# Patient Record
Sex: MALE | Race: WHITE | HISPANIC OR LATINO
[De-identification: names, ages, dates, MRNs, and addresses within clinical notes are randomized per-mention and may not be internally consistent; named-entity substitution may affect disease eponyms.]

---

## 2017-01-10 ENCOUNTER — LAB SERVICES (OUTPATIENT)
Dept: OTHER | Age: 22
End: 2017-01-10

## 2017-01-12 ENCOUNTER — CHARTING TRANS (OUTPATIENT)
Dept: OTHER | Age: 22
End: 2017-01-12

## 2017-01-12 LAB
ALLERGEN CLAM IGE (CLMIGE): <0.35 KU/L
ALLERGEN CODFISH IGE (CODF): <0.35 KU/L
ALLERGEN CORN IGE (CRN): <0.35 KU/L
ALLERGEN COW'S MILK IGE (MLK): <0.35 KU/L
ALLERGEN EGG WHITE IGE (EGGW): <0.35 KU/L
ALLERGEN PEANUT IGE (PNUT): <0.35 KU/L
ALLERGEN SCALLOP IGE (SCALLP): <0.35 KU/L
ALLERGEN SHRIMP IGE (SRMP): <0.35 KU/L
ALLERGEN SOYBEAN IGE (SOY): <0.35 KU/L
ALLERGEN WALNUT IGE (WALIGE): <0.35 KU/L
ALLERGEN WHEAT IGE (WEAT): <0.35 KU/L
CLAM CLASS (CLMCL): 0
CODFISH CLASS (CODCL): 0
CORN CLASS (CRNCL): 0
COW'S MILK CLASS (MLKCL): 0
EGG WHITE CLASS (EGGCL): 0
PEANUT CLASS (PNUCL): 0
SCALLOP CLASS (SCACL): 0
SHRIMP CLASS (SRMCL): 0
SOYBEAN CLASS (SOYCL): 0
WALNUT CLASS (WALCL): 0
WHEAT CLASS (WEACL): 0

## 2018-04-11 ENCOUNTER — OFFICE VISIT (OUTPATIENT)
Dept: FAMILY MEDICINE CLINIC | Facility: CLINIC | Age: 23
End: 2018-04-11

## 2018-04-11 VITALS
HEART RATE: 112 BPM | HEIGHT: 67 IN | BODY MASS INDEX: 17.27 KG/M2 | SYSTOLIC BLOOD PRESSURE: 104 MMHG | DIASTOLIC BLOOD PRESSURE: 62 MMHG | WEIGHT: 110 LBS

## 2018-04-11 DIAGNOSIS — Z00.00 ROUTINE GENERAL MEDICAL EXAMINATION AT A HEALTH CARE FACILITY: Primary | ICD-10-CM

## 2018-04-11 PROCEDURE — 99385 PREV VISIT NEW AGE 18-39: CPT | Performed by: FAMILY MEDICINE

## 2018-04-18 NOTE — PROGRESS NOTES
Kiki Rodriguez is a 23year old male who is here for Patient presents with:   Well Adult      HPI:     Here to establish care    Patient has severe autism  -has been home schooled by his parents, who have made use of many services in the past  -they feel lik Alcohol use:  No                    EXAM:   /62 (BP Location: Left arm, Patient Position: Sitting, Cuff Size: adult)   Pulse 112   Ht 67\"   Wt 110 lb   BMI 17.23 kg/m²     GENERAL: thin, anxious appearing  NECK: supple, no jvd, no thyromeg

## 2019-06-12 ENCOUNTER — OFFICE VISIT (OUTPATIENT)
Dept: FAMILY MEDICINE CLINIC | Facility: CLINIC | Age: 24
End: 2019-06-12
Payer: COMMERCIAL

## 2019-06-12 VITALS
DIASTOLIC BLOOD PRESSURE: 60 MMHG | HEIGHT: 67 IN | BODY MASS INDEX: 18.56 KG/M2 | WEIGHT: 118.25 LBS | HEART RATE: 96 BPM | SYSTOLIC BLOOD PRESSURE: 110 MMHG | TEMPERATURE: 96 F

## 2019-06-12 DIAGNOSIS — F81.89 DEVELOPMENTAL NON-VERBAL DISORDER: ICD-10-CM

## 2019-06-12 DIAGNOSIS — Z00.00 ROUTINE GENERAL MEDICAL EXAMINATION AT A HEALTH CARE FACILITY: Primary | ICD-10-CM

## 2019-06-12 DIAGNOSIS — F41.9 ANXIETY: ICD-10-CM

## 2019-06-12 DIAGNOSIS — F84.0 AUTISM DISORDER: ICD-10-CM

## 2019-06-12 PROCEDURE — 99395 PREV VISIT EST AGE 18-39: CPT | Performed by: FAMILY MEDICINE

## 2019-06-12 PROCEDURE — 99214 OFFICE O/P EST MOD 30 MIN: CPT | Performed by: FAMILY MEDICINE

## 2019-06-12 NOTE — PATIENT INSTRUCTIONS
I will touch base with psychiatrist and get back to you with a plan     will touch base with you to potentially help with community resources     Followup as needed

## 2019-06-12 NOTE — PROGRESS NOTES
Atul Ha is a 26year old male who is here for Patient presents with:   Anxiety: Patient is having a lot of anxiety lately      HPI:     Due for wellness    Patient has severe autism/Anxiety  -non-verbal  -appears to have significant anxiety  -dad thin Tobacco Use      Smoking status: Never Smoker      Smokeless tobacco: Never Used    Alcohol use: No    Drug use: No          EXAM:   /60 (BP Location: Left arm, Patient Position: Sitting, Cuff Size: adult)   Pulse 96   Temp (!) 96.4 °F (35.8 °C) (Ora

## 2019-06-17 ENCOUNTER — TELEPHONE (OUTPATIENT)
Dept: FAMILY MEDICINE CLINIC | Facility: CLINIC | Age: 24
End: 2019-06-17

## 2019-06-17 RX ORDER — FLUOXETINE HYDROCHLORIDE 20 MG/5ML
10 LIQUID ORAL NIGHTLY
Qty: 120 ML | Refills: 1 | Status: SHIPPED | OUTPATIENT
Start: 2019-06-17 | End: 2019-07-10

## 2019-06-17 RX ORDER — FLUOXETINE HYDROCHLORIDE 20 MG/5ML
10 LIQUID ORAL NIGHTLY
Qty: 120 ML | Refills: 1 | Status: SHIPPED | OUTPATIENT
Start: 2019-06-17 | End: 2019-06-17

## 2019-06-17 NOTE — TELEPHONE ENCOUNTER
Called patient's father. We will treat anxiety and repetitive behaviors with liquid fluoxetine 10mg nightly. Side effects reviewed.   He will touch base with us in 2 wks, sooner if needed

## 2019-07-03 ENCOUNTER — TELEPHONE (OUTPATIENT)
Dept: FAMILY MEDICINE CLINIC | Facility: CLINIC | Age: 24
End: 2019-07-03

## 2019-07-03 NOTE — TELEPHONE ENCOUNTER
Pt father came in states pt is tolerating new medication Fluoxetine well with no side effects but not sure it's really doing much to help either.  Patients father would like to know if pt should continue same dose or will Dr. Romeo Garcia consider increasing the d

## 2019-07-03 NOTE — TELEPHONE ENCOUNTER
Phoned father and instructed him to continue with current dose and call on Monday when Dr. Ayana Hitchcock is in the office.

## 2019-07-10 ENCOUNTER — TELEPHONE (OUTPATIENT)
Dept: FAMILY MEDICINE CLINIC | Facility: CLINIC | Age: 24
End: 2019-07-10

## 2019-07-10 RX ORDER — FLUOXETINE HYDROCHLORIDE 20 MG/5ML
20 LIQUID ORAL NIGHTLY
Qty: 120 ML | Refills: 1 | Status: SHIPPED | OUTPATIENT
Start: 2019-07-10 | End: 2019-07-26

## 2019-07-10 NOTE — TELEPHONE ENCOUNTER
Ok to increase the dose to 20mg (or 5ml), if he hasn't already done so. If he has, increase dose to 40mg (10ml). It can take another 2-4 wks to take effect. Touch base with me at that time.     If needs refill, ok for refill with updated dosing and 3

## 2019-07-10 NOTE — TELEPHONE ENCOUNTER
Dr. Gilma Beard. Pts father called and stated his son is tolerating the new medication (fluoxetine) but doesn't seem to be helping and is wondering if they can increase the dose. OV 6/17/19. Please advise.

## 2019-07-10 NOTE — TELEPHONE ENCOUNTER
Father called back. He has been giving the 10mg dose (2.5ml). Instructed him to increase dose and allow 2-4 weeks to take effect and then touch base. Stated he will need a refill so refill sent.

## 2019-07-26 NOTE — TELEPHONE ENCOUNTER
Spoke with father and relayed new instructions. He verbalized understanding and agreed with plan. Medication list updated.

## 2019-07-26 NOTE — TELEPHONE ENCOUNTER
Atul Forte is calling to see if Dr Lalo Carbajal is going to increase [address] FLUoxetine HCl 20 MG/5ML Oral Solution  He is currently taking 5ml 1x at bed, he started at a low dose and Dr Lalo Carbajal doubled it 2 weeks ago, and now it has been about 16 days and he is due for a

## 2019-07-26 NOTE — TELEPHONE ENCOUNTER
Have them increase to 10ml (40mg) nightly. Touch base in 2-4 wks. If still no change, let me know - we may have to switch medication.

## 2019-07-29 DIAGNOSIS — F41.9 ANXIETY: ICD-10-CM

## 2019-07-29 RX ORDER — FLUOXETINE HYDROCHLORIDE 20 MG/5ML
40 LIQUID ORAL NIGHTLY
Qty: 360 ML | Refills: 0 | Status: SHIPPED | OUTPATIENT
Start: 2019-07-29 | End: 2019-08-01

## 2019-08-01 ENCOUNTER — TELEPHONE (OUTPATIENT)
Dept: FAMILY MEDICINE CLINIC | Facility: CLINIC | Age: 24
End: 2019-08-01

## 2019-08-01 DIAGNOSIS — F41.9 ANXIETY: ICD-10-CM

## 2019-08-01 RX ORDER — FLUOXETINE HYDROCHLORIDE 20 MG/5ML
40 LIQUID ORAL NIGHTLY
Qty: 900 ML | Refills: 0 | Status: SHIPPED | OUTPATIENT
Start: 2019-08-01 | End: 2019-10-19

## 2019-08-07 ENCOUNTER — TELEPHONE (OUTPATIENT)
Dept: FAMILY MEDICINE CLINIC | Facility: CLINIC | Age: 24
End: 2019-08-07

## 2019-08-07 NOTE — TELEPHONE ENCOUNTER
Pt's father Carla Floyd) dropped off court documents that need to be filled out by Dr. Sydney Fuentes. Joseluis said Susan Hunter has an appt next week and will get the papers back from Dr. Sydney Fuentes on Tuesday. I put the papers on Karyn's desk.

## 2019-08-13 NOTE — TELEPHONE ENCOUNTER
Phoned his father. He stated there is not much difference with the new dose. Its been about one week since new dose. Father is OK with waiting until office visit to make any changes. He stated he will call if anything else comes up.

## 2019-08-13 NOTE — TELEPHONE ENCOUNTER
Please apologize for cancelled appt today. Also see how Stefani Sterling is doing with higher dose of fluoxetine. If they still haven't noticed any changes, we can consider trying a different medication. Let me know, Thanks.

## 2019-08-19 PROBLEM — F41.9 ANXIETY: Status: ACTIVE | Noted: 2019-08-19

## 2019-08-19 PROBLEM — F81.89 DEVELOPMENTAL NON-VERBAL DISORDER: Status: ACTIVE | Noted: 2019-08-19

## 2019-08-19 PROBLEM — F84.0 AUTISM DISORDER: Status: ACTIVE | Noted: 2019-08-19

## 2019-08-19 NOTE — TELEPHONE ENCOUNTER
Left voicemail, informing patient's father that [address] papers are filled out and ready to be picked up. Asked father to call office with any questions or concerns.

## 2019-10-19 DIAGNOSIS — F41.9 ANXIETY: ICD-10-CM

## 2019-10-21 RX ORDER — FLUOXETINE HYDROCHLORIDE 20 MG/5ML
LIQUID ORAL
Qty: 780 ML | Refills: 1 | Status: SHIPPED | OUTPATIENT
Start: 2019-10-21 | End: 2020-01-15

## 2019-10-21 NOTE — TELEPHONE ENCOUNTER
Pt requesting refill of FLUOXETINE HCL 20 MG/5ML Oral Solution , refill approved, sent to pharmacy:       Last Office Visit with PCP:  6/12/19      No future appointments.

## 2020-01-10 ENCOUNTER — TELEPHONE (OUTPATIENT)
Dept: FAMILY MEDICINE CLINIC | Facility: CLINIC | Age: 25
End: 2020-01-10

## 2020-01-10 DIAGNOSIS — Z20.2 POSSIBLE EXPOSURE TO STD: Primary | ICD-10-CM

## 2020-01-10 NOTE — TELEPHONE ENCOUNTER
Pt's father came in and said he would like an order for ANETTE Garcia. The entire family is being tested.

## 2020-01-15 DIAGNOSIS — F41.9 ANXIETY: ICD-10-CM

## 2020-01-15 RX ORDER — FLUOXETINE HYDROCHLORIDE 20 MG/5ML
LIQUID ORAL
Qty: 900 ML | Refills: 1 | Status: SHIPPED | OUTPATIENT
Start: 2020-01-15 | End: 2020-07-20

## 2020-01-15 NOTE — TELEPHONE ENCOUNTER
Pt requesting refill of FLUOXETINE HCL 20 MG/5ML Oral Solution    Last Time Medication was Filled:  10/21/19    Last Office Visit with PCP: 6/12/19  No future appointments.

## 2020-01-17 DIAGNOSIS — Z20.2 POSSIBLE EXPOSURE TO STD: Primary | ICD-10-CM

## 2020-07-19 DIAGNOSIS — F41.9 ANXIETY: ICD-10-CM

## 2020-07-20 RX ORDER — FLUOXETINE HYDROCHLORIDE 20 MG/5ML
LIQUID ORAL
Qty: 900 ML | Refills: 1 | Status: SHIPPED | OUTPATIENT
Start: 2020-07-20 | End: 2021-01-04

## 2020-07-20 NOTE — TELEPHONE ENCOUNTER
Refill request for fluoxetine 20mg/5ml. Last fill was 1/15/20 900ml with 1 refill. Last OV 6/12/19.  No future OV

## 2021-01-04 DIAGNOSIS — F41.9 ANXIETY: ICD-10-CM

## 2021-01-04 RX ORDER — FLUOXETINE HYDROCHLORIDE 20 MG/5ML
LIQUID ORAL
Qty: 840 ML | Refills: 2 | Status: SHIPPED | OUTPATIENT
Start: 2021-01-04 | End: 2021-04-01

## 2021-04-01 ENCOUNTER — TELEPHONE (OUTPATIENT)
Dept: FAMILY MEDICINE CLINIC | Facility: CLINIC | Age: 26
End: 2021-04-01

## 2021-04-01 DIAGNOSIS — F41.9 ANXIETY: ICD-10-CM

## 2021-04-01 RX ORDER — FLUOXETINE HYDROCHLORIDE 20 MG/5ML
40 LIQUID ORAL NIGHTLY
Qty: 840 ML | Refills: 1 | Status: SHIPPED | OUTPATIENT
Start: 2021-04-01 | End: 2021-08-31

## 2021-04-01 NOTE — TELEPHONE ENCOUNTER
Requested Prescriptions     Signed Prescriptions Disp Refills   • FLUoxetine HCl 20 MG/5ML Oral Solution 840 mL 1     Sig: Take 10 mL (40 mg total) by mouth nightly.      Authorizing Provider: Kasey Hunt     Ordering User: Yadi Massey     Last filled

## 2021-07-20 ENCOUNTER — TELEPHONE (OUTPATIENT)
Dept: FAMILY MEDICINE CLINIC | Facility: CLINIC | Age: 26
End: 2021-07-20
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
## 2021-07-22 NOTE — PATIENT INSTRUCTIONS
Continue with epsom soaking at least once daily for toe  Use antibiotic ointment 1-2x/day after soaking     will call with counseling options    Followup as needed
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
## 2021-08-30 DIAGNOSIS — F41.9 ANXIETY: ICD-10-CM

## 2021-08-31 RX ORDER — FLUOXETINE HYDROCHLORIDE 20 MG/5ML
LIQUID ORAL
Qty: 840 ML | Refills: 3 | Status: SHIPPED | OUTPATIENT
Start: 2021-08-31

## 2021-08-31 NOTE — TELEPHONE ENCOUNTER
FLUoxetine HCl 20 MG/5ML Oral Solution 840 mL 1 4/1/2021    Sig:   Take 10 mL (40 mg total) by mouth nightly.        LOV 7/22/21  The patient is asked to return in prn.       NO FOV

## 2022-08-02 DIAGNOSIS — F41.9 ANXIETY: ICD-10-CM

## 2022-08-04 RX ORDER — FLUOXETINE HYDROCHLORIDE 20 MG/5ML
LIQUID ORAL
Qty: 840 ML | Refills: 3 | Status: SHIPPED | OUTPATIENT
Start: 2022-08-04

## 2022-09-26 ENCOUNTER — OFFICE VISIT (OUTPATIENT)
Dept: FAMILY MEDICINE CLINIC | Facility: CLINIC | Age: 27
End: 2022-09-26

## 2022-09-26 VITALS
OXYGEN SATURATION: 96 % | BODY MASS INDEX: 22.76 KG/M2 | HEIGHT: 67 IN | TEMPERATURE: 98 F | DIASTOLIC BLOOD PRESSURE: 60 MMHG | HEART RATE: 50 BPM | WEIGHT: 145 LBS | SYSTOLIC BLOOD PRESSURE: 100 MMHG

## 2022-09-26 DIAGNOSIS — Z23 NEED FOR VACCINATION: ICD-10-CM

## 2022-09-26 DIAGNOSIS — Z13.228 SCREENING FOR ENDOCRINE, METABOLIC AND IMMUNITY DISORDER: ICD-10-CM

## 2022-09-26 DIAGNOSIS — F41.9 ANXIETY: ICD-10-CM

## 2022-09-26 DIAGNOSIS — F84.0 AUTISM DISORDER: ICD-10-CM

## 2022-09-26 DIAGNOSIS — Z00.00 ROUTINE GENERAL MEDICAL EXAMINATION AT A HEALTH CARE FACILITY: Primary | ICD-10-CM

## 2022-09-26 DIAGNOSIS — Z13.29 SCREENING FOR ENDOCRINE, METABOLIC AND IMMUNITY DISORDER: ICD-10-CM

## 2022-09-26 DIAGNOSIS — Z13.0 SCREENING FOR ENDOCRINE, METABOLIC AND IMMUNITY DISORDER: ICD-10-CM

## 2022-09-26 DIAGNOSIS — F81.89 DEVELOPMENTAL NON-VERBAL DISORDER: ICD-10-CM

## 2022-09-26 PROCEDURE — 3008F BODY MASS INDEX DOCD: CPT | Performed by: FAMILY MEDICINE

## 2022-09-26 PROCEDURE — 99395 PREV VISIT EST AGE 18-39: CPT | Performed by: FAMILY MEDICINE

## 2022-09-26 PROCEDURE — 99214 OFFICE O/P EST MOD 30 MIN: CPT | Performed by: FAMILY MEDICINE

## 2022-09-26 PROCEDURE — 3074F SYST BP LT 130 MM HG: CPT | Performed by: FAMILY MEDICINE

## 2022-09-26 PROCEDURE — 3078F DIAST BP <80 MM HG: CPT | Performed by: FAMILY MEDICINE

## 2022-11-09 LAB
ABSOLUTE BASOPHILS: 40 CELLS/UL (ref 0–200)
ABSOLUTE EOSINOPHILS: 382 CELLS/UL (ref 15–500)
ABSOLUTE LYMPHOCYTES: 2440 CELLS/UL (ref 850–3900)
ABSOLUTE MONOCYTES: 485 CELLS/UL (ref 200–950)
ABSOLUTE NEUTROPHILS: 2354 CELLS/UL (ref 1500–7800)
ALBUMIN/GLOBULIN RATIO: 1.6 (CALC) (ref 1–2.5)
ALBUMIN: 4.6 G/DL (ref 3.6–5.1)
ALKALINE PHOSPHATASE: 59 U/L (ref 36–130)
ALT: 26 U/L (ref 9–46)
AST: 23 U/L (ref 10–40)
BASOPHILS: 0.7 %
BILIRUBIN, TOTAL: 0.5 MG/DL (ref 0.2–1.2)
BUN: 17 MG/DL (ref 7–25)
CALCIUM: 9.8 MG/DL (ref 8.6–10.3)
CARBON DIOXIDE: 21 MMOL/L (ref 20–32)
CHLORIDE: 108 MMOL/L (ref 98–110)
CHOL/HDLC RATIO: 5 (CALC)
CHOLESTEROL, TOTAL: 190 MG/DL
CREATININE: 0.83 MG/DL (ref 0.6–1.24)
EGFR: 125 ML/MIN/1.73M2
EOSINOPHILS: 6.7 %
GLOBULIN: 2.9 G/DL (CALC) (ref 1.9–3.7)
GLUCOSE: 84 MG/DL (ref 65–99)
HDL CHOLESTEROL: 38 MG/DL
HEMATOCRIT: 49.7 % (ref 38.5–50)
HEMOGLOBIN: 17 G/DL (ref 13.2–17.1)
LDL-CHOLESTEROL: 110 MG/DL (CALC)
LYMPHOCYTES: 42.8 %
MCH: 31.5 PG (ref 27–33)
MCHC: 34.2 G/DL (ref 32–36)
MCV: 92 FL (ref 80–100)
MONOCYTES: 8.5 %
MPV: 12.2 FL (ref 7.5–12.5)
NEUTROPHILS: 41.3 %
NON-HDL CHOLESTEROL: 152 MG/DL (CALC)
PLATELET COUNT: 188 THOUSAND/UL (ref 140–400)
POTASSIUM: 3.9 MMOL/L (ref 3.5–5.3)
PROTEIN, TOTAL: 7.5 G/DL (ref 6.1–8.1)
RDW: 11.8 % (ref 11–15)
RED BLOOD CELL COUNT: 5.4 MILLION/UL (ref 4.2–5.8)
SODIUM: 140 MMOL/L (ref 135–146)
TRIGLYCERIDES: 292 MG/DL
TSH W/REFLEX TO FT4: 2.65 MIU/L (ref 0.4–4.5)
WHITE BLOOD CELL COUNT: 5.7 THOUSAND/UL (ref 3.8–10.8)

## 2023-05-09 ENCOUNTER — PATIENT MESSAGE (OUTPATIENT)
Dept: FAMILY MEDICINE CLINIC | Facility: CLINIC | Age: 28
End: 2023-05-09

## 2023-05-11 NOTE — TELEPHONE ENCOUNTER
From: Venkatesh Samuel  To: Daniel Loyd MD  Sent: 5/9/2023 3:01 PM CDT  Subject: Letter for France Mcmillan    Dr. Jeannie Mosquera, my new employer [employer]) will cover France Mcmillan on my health benefits beyond age 34 if we provide a letter as outlined below. Please let me know if you need a new appointment with France Mcmillan to complete it. Thanks, Laurie certificate stating the child is incapable of self-support because of a physical or mental disability that existed before he/she became age 34 and is expected to continue for more than one year. Additional information required to be included in the certification can be found here:   [email or web address].gov/healthcare-insurance/healthcare/reference-materials/reference/family-[email or web address]/#[email or web address].  \"

## 2023-08-01 DIAGNOSIS — F41.9 ANXIETY: ICD-10-CM

## 2023-08-02 DIAGNOSIS — F41.9 ANXIETY: ICD-10-CM

## 2023-08-02 RX ORDER — FLUOXETINE HYDROCHLORIDE 20 MG/5ML
40 LIQUID ORAL NIGHTLY
Qty: 840 ML | Refills: 0 | Status: SHIPPED | OUTPATIENT
Start: 2023-08-02

## 2023-08-02 RX ORDER — FLUOXETINE HYDROCHLORIDE 20 MG/5ML
40 LIQUID ORAL NIGHTLY
Qty: 840 ML | Refills: 3 | OUTPATIENT
Start: 2023-08-02

## 2023-08-02 RX ORDER — FLUOXETINE HYDROCHLORIDE 20 MG/5ML
40 LIQUID ORAL NIGHTLY
Qty: 240 ML | Refills: 0 | OUTPATIENT
Start: 2023-08-02

## 2023-08-02 NOTE — TELEPHONE ENCOUNTER
Refill request came in for the fluoxetine. Pt moved to [place] and last seen was Sept 2022. Refill was denied and asked that patient establish with a provider in [place] but would see if Dr Arnoldo Quintana will approve a refill of the medication in the meantime. Pt's Dad stated that they just moved to [place] and they may still want to continue care with Dr. Arnoldo Quintana. States that also they have an agreement that patient is ok to only be seen once a year. Refill sent. Doc, is this the case pt ok to be seen once a year if they remain patient's here?

## 2023-08-03 ENCOUNTER — PATIENT MESSAGE (OUTPATIENT)
Dept: FAMILY MEDICINE CLINIC | Facility: CLINIC | Age: 28
End: 2023-08-03

## 2023-08-04 NOTE — TELEPHONE ENCOUNTER
From: Katya Roberto  To: Nena Holt MD  Sent: 8/3/2023 5:18 PM CDT  Subject: Luke[facility] Rx    Estrella Reyes has 2 days worth of FLUOXETINE left. Please call me if it will not be approved for refill.

## 2023-10-17 ENCOUNTER — TELEPHONE (OUTPATIENT)
Dept: FAMILY MEDICINE CLINIC | Facility: CLINIC | Age: 28
End: 2023-10-17

## 2023-10-17 NOTE — TELEPHONE ENCOUNTER
Received fax on 10/17/2023 requesting medical records. Requesting all medical records for continuation of care. [address], [place], [address] [facility].  Phone: [phone]  Fax: [phone]    Original sent to [employer] [facility], copy sent to medical records.  Red Tag # [id number]

## 2023-10-17 NOTE — TELEPHONE ENCOUNTER
Requesting PCP Removal on date 10/17/2023 per patient. Removal Reason:    Medical Records Request form received from new PCP. [address] [place] [address], [facility] [address] [facility]  Phone: [phone]  Fax: [phone]

## 2023-10-19 ENCOUNTER — PATIENT OUTREACH (OUTPATIENT)
Dept: CASE MANAGEMENT | Age: 28
End: 2023-10-19

## 2023-10-19 NOTE — PROCEDURES
The office order for PCP removal request is Approved and finalized on October 19, 2023.     Thanks,  [facility] [employer]

## (undated) NOTE — LETTER
Date: 2023    Patient Name: Jordy Holden, : 25        To Whom it may concern: The above patient was seen at the [facility] for treatment of a medical condition. Timothy Darling has been diagnosed with severe autism and non-verbal developmental disorder for most of his life. This issue existed well before his 28th birthday. He is incapable of self-support because of this mental disability. This is expected to last life-long. He needs help with most aspects of daily living will likely continue to do so in the long term. Support services and behavioral management have been the mainstays of his treatment and are intended to help with basic communication and coping skills. Beyond this, there is no expectation for improvement toward any level of self-support.           Sincerely,    Renata Sexton MD